# Patient Record
Sex: FEMALE | Race: WHITE | URBAN - METROPOLITAN AREA
[De-identification: names, ages, dates, MRNs, and addresses within clinical notes are randomized per-mention and may not be internally consistent; named-entity substitution may affect disease eponyms.]

---

## 2019-04-05 ENCOUNTER — TRANSFERRED RECORDS (OUTPATIENT)
Dept: HEALTH INFORMATION MANAGEMENT | Facility: CLINIC | Age: 74
End: 2019-04-05

## 2019-04-05 ENCOUNTER — MEDICAL CORRESPONDENCE (OUTPATIENT)
Dept: HEALTH INFORMATION MANAGEMENT | Facility: CLINIC | Age: 74
End: 2019-04-05

## 2019-04-29 ENCOUNTER — DOCUMENTATION ONLY (OUTPATIENT)
Dept: CARE COORDINATION | Facility: CLINIC | Age: 74
End: 2019-04-29

## 2019-05-02 ENCOUNTER — TRANSFERRED RECORDS (OUTPATIENT)
Dept: HEALTH INFORMATION MANAGEMENT | Facility: CLINIC | Age: 74
End: 2019-05-02

## 2019-05-08 ENCOUNTER — TELEPHONE (OUTPATIENT)
Dept: NEUROSURGERY | Facility: CLINIC | Age: 74
End: 2019-05-08

## 2019-05-08 ENCOUNTER — OFFICE VISIT (OUTPATIENT)
Dept: NEUROSURGERY | Facility: CLINIC | Age: 74
End: 2019-05-08
Payer: COMMERCIAL

## 2019-05-08 VITALS
OXYGEN SATURATION: 98 % | BODY MASS INDEX: 20.18 KG/M2 | WEIGHT: 113.9 LBS | HEART RATE: 65 BPM | DIASTOLIC BLOOD PRESSURE: 47 MMHG | HEIGHT: 63 IN | SYSTOLIC BLOOD PRESSURE: 135 MMHG

## 2019-05-08 DIAGNOSIS — R51.9 FACIAL PAIN: Primary | ICD-10-CM

## 2019-05-08 DIAGNOSIS — R51.9 FACIAL PAIN: ICD-10-CM

## 2019-05-08 LAB
ALBUMIN SERPL-MCNC: 4 G/DL (ref 3.4–5)
ALP SERPL-CCNC: 101 U/L (ref 40–150)
ALT SERPL W P-5'-P-CCNC: 25 U/L (ref 0–50)
ANION GAP SERPL CALCULATED.3IONS-SCNC: 5 MMOL/L (ref 3–14)
AST SERPL W P-5'-P-CCNC: 18 U/L (ref 0–45)
BASOPHILS # BLD AUTO: 0 10E9/L (ref 0–0.2)
BASOPHILS NFR BLD AUTO: 0.5 %
BILIRUB SERPL-MCNC: 0.4 MG/DL (ref 0.2–1.3)
BUN SERPL-MCNC: 12 MG/DL (ref 7–30)
CALCIUM SERPL-MCNC: 8.9 MG/DL (ref 8.5–10.1)
CHLORIDE SERPL-SCNC: 95 MMOL/L (ref 94–109)
CO2 SERPL-SCNC: 28 MMOL/L (ref 20–32)
CREAT SERPL-MCNC: 0.49 MG/DL (ref 0.52–1.04)
DIFFERENTIAL METHOD BLD: ABNORMAL
EOSINOPHIL # BLD AUTO: 0.2 10E9/L (ref 0–0.7)
EOSINOPHIL NFR BLD AUTO: 3 %
ERYTHROCYTE [DISTWIDTH] IN BLOOD BY AUTOMATED COUNT: 11.5 % (ref 10–15)
GFR SERPL CREATININE-BSD FRML MDRD: >90 ML/MIN/{1.73_M2}
GLUCOSE SERPL-MCNC: 91 MG/DL (ref 70–99)
HCT VFR BLD AUTO: 36.9 % (ref 35–47)
HGB BLD-MCNC: 13.1 G/DL (ref 11.7–15.7)
IMM GRANULOCYTES # BLD: 0.1 10E9/L (ref 0–0.4)
IMM GRANULOCYTES NFR BLD: 0.9 %
LYMPHOCYTES # BLD AUTO: 2.2 10E9/L (ref 0.8–5.3)
LYMPHOCYTES NFR BLD AUTO: 28.4 %
MCH RBC QN AUTO: 34 PG (ref 26.5–33)
MCHC RBC AUTO-ENTMCNC: 35.5 G/DL (ref 31.5–36.5)
MCV RBC AUTO: 96 FL (ref 78–100)
MONOCYTES # BLD AUTO: 0.9 10E9/L (ref 0–1.3)
MONOCYTES NFR BLD AUTO: 11.3 %
NEUTROPHILS # BLD AUTO: 4.2 10E9/L (ref 1.6–8.3)
NEUTROPHILS NFR BLD AUTO: 55.9 %
NRBC # BLD AUTO: 0 10*3/UL
NRBC BLD AUTO-RTO: 0 /100
PLATELET # BLD AUTO: 293 10E9/L (ref 150–450)
POTASSIUM SERPL-SCNC: 3.9 MMOL/L (ref 3.4–5.3)
PROT SERPL-MCNC: 7.4 G/DL (ref 6.8–8.8)
RBC # BLD AUTO: 3.85 10E12/L (ref 3.8–5.2)
SODIUM SERPL-SCNC: 128 MMOL/L (ref 133–144)
WBC # BLD AUTO: 7.6 10E9/L (ref 4–11)

## 2019-05-08 RX ORDER — BACLOFEN 10 MG/1
TABLET ORAL
Refills: 0 | COMMUNITY
Start: 2019-05-01 | End: 2019-06-10

## 2019-05-08 RX ORDER — CARBAMAZEPINE 200 MG/1
1 TABLET ORAL
Refills: 1 | COMMUNITY
Start: 2019-04-16 | End: 2019-06-25

## 2019-05-08 RX ORDER — ACETAMINOPHEN 325 MG/1
650 TABLET ORAL EVERY 4 HOURS PRN
COMMUNITY

## 2019-05-08 RX ORDER — HYDROCODONE BITARTRATE AND ACETAMINOPHEN 5; 325 MG/1; MG/1
TABLET ORAL
Refills: 0 | COMMUNITY
Start: 2019-04-24

## 2019-05-08 ASSESSMENT — PAIN SCALES - GENERAL: PAINLEVEL: NO PAIN (0)

## 2019-05-08 ASSESSMENT — MIFFLIN-ST. JEOR: SCORE: 995.78
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
## 2019-05-08 NOTE — PATIENT INSTRUCTIONS
1.  Take Tegretol (200 mg/ tablet) base on the following schedule:       Take 1.5 tablet (= 300 mg) at 7 am, 2 pm and 9 pm.     2.  Stop by the lab on the first floor today for blood drawn on the way out.     3.   Sybil RN will call patient Friday morning for medication update.  [phone].     4. Follow up in 4-6 weeks with XOCHITL Morrow, same day as research MRI.    Thank you for using [facility] for your healthcare needs.
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
## 2019-05-08 NOTE — TELEPHONE ENCOUNTER
Spoke with patient, explained Na level 128 slightly low, please add table salt daily, will re draw at OV in 4-6 weeks.  Pt teachback method completed.    CHAVEZ Devine            Please reorder the Tegretol level.  Her Na is 128. I have already reduced her Tegretol from 600 mg BID to 300 mg TID. Please call her and tell her to add extra NA in her diet.  We will need to have her repeat the CMP when she returns to see me.    Thanks,  XOCHITL Morrow

## 2019-05-08 NOTE — LETTER
Date:May 15, 2019      Patient was self referred, no letter generated. Do not send.        [facility] Information

## 2019-05-08 NOTE — NURSING NOTE
Chief Complaint   Patient presents with     Consult     [facility] NEW - FACIAL PAIN, TRIGEMINAL NEURALGIA       Kevin Donovan, EMT

## 2019-05-10 ENCOUNTER — PATIENT OUTREACH (OUTPATIENT)
Dept: NEUROSURGERY | Facility: CLINIC | Age: 74
End: 2019-05-10

## 2019-05-10 NOTE — PROGRESS NOTES
Spoke with patient regarding medication changes made at OV last Wed.    Pt now taking 1/1/2 tablets TID.  Pt states awoke both Thursday and Friday morning in pain prior to taking morning medication, appears to have breakthrough pain early AM, night time dosage not holding away discomfort.  After 10-11ish no real facial discomfort, it just starts early in the morning.  Will refer to NP Ritter and get back to patient.

## 2019-05-13 DIAGNOSIS — E87.1 LOW SODIUM LEVELS: Primary | ICD-10-CM

## 2019-05-13 NOTE — PROGRESS NOTES
Spoke with patient, states having breakthrough facial pains many times over the weekend and states last night had pain through the night.    Remains on Tegretol 1 1/2 tablets TID.  Will refer to NP Ritter and return call today.  Voices understanding.

## 2019-05-13 NOTE — PROGRESS NOTES
She is already taking Baclofen for muscle spasm. I have spoken to the  and will try the following regimen:   Baclofen 10 mg at  7 am, 1 pm, and 7 pm   Tegretol 300 mg at 10 am, 4 pm and 11 pm.   Per NP Ritter    Call set to update with patient in 3 days.

## 2019-05-14 ENCOUNTER — PATIENT OUTREACH (OUTPATIENT)
Dept: NEUROSURGERY | Facility: CLINIC | Age: 74
End: 2019-05-14

## 2019-05-14 NOTE — PROGRESS NOTES
Update call to patient, Patient continues to have breakthrough facial pains, awoke with pain today, states pain goes down Right jaw.  States taking medication on schedule per NP Ritter  Pt will have Na level near home  O   F     Lab order faxed.    Will send note to NP Ritter explaining patient continues to have break through facial pains.   Voices understanding.

## 2019-05-16 ENCOUNTER — PATIENT OUTREACH (OUTPATIENT)
Dept: NEUROSURGERY | Facility: CLINIC | Age: 74
End: 2019-05-16

## 2019-05-16 NOTE — PROGRESS NOTES
New Na level 136.  Call to patient, spoke with  Gerson with the lab work in therapeutic range.  States taking medication as directed by NP Cristhian, states having some breakthrough pains but has improved with severity of pain.    Recommend follow NP medication schedule, callback if symptoms change, worsen, or concerns.    Voices understanding.

## 2019-06-06 ENCOUNTER — OFFICE VISIT (OUTPATIENT)
Dept: NEUROSURGERY | Facility: CLINIC | Age: 74
End: 2019-06-06
Payer: COMMERCIAL

## 2019-06-06 VITALS
DIASTOLIC BLOOD PRESSURE: 80 MMHG | HEIGHT: 63 IN | BODY MASS INDEX: 19.68 KG/M2 | SYSTOLIC BLOOD PRESSURE: 140 MMHG | TEMPERATURE: 98 F | HEART RATE: 62 BPM | WEIGHT: 111.1 LBS | RESPIRATION RATE: 16 BRPM | OXYGEN SATURATION: 97 %

## 2019-06-06 DIAGNOSIS — G50.0 TRIGEMINAL NEURALGIA: Primary | ICD-10-CM

## 2019-06-06 ASSESSMENT — PAIN SCALES - GENERAL: PAINLEVEL: MILD PAIN (3)

## 2019-06-06 ASSESSMENT — MIFFLIN-ST. JEOR: SCORE: 983.08
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
## 2019-06-06 NOTE — NURSING NOTE
Chief Complaint   Patient presents with     Facial Pain     [facility] RETURN FACIAL PAIN, F/U       See Garibay, EMT

## 2019-06-06 NOTE — PATIENT INSTRUCTIONS
Follow up and lab test in 2 months.    Continue taking Tegretol and Baclofen as instructed.    Call [phone] for concerns or questions.
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
## 2019-06-10 DIAGNOSIS — G50.0 TRIGEMINAL NEURALGIA: Primary | ICD-10-CM

## 2019-06-10 RX ORDER — BACLOFEN 10 MG/1
10 TABLET ORAL 3 TIMES DAILY
Qty: 90 TABLET | Refills: 3 | Status: SHIPPED | OUTPATIENT
Start: 2019-06-10

## 2019-06-10 NOTE — PROGRESS NOTES
Patient calling asking for refill of Baclofen, taking 10mg TID.  States falcial pain mostly under control taking the Bacolen and Tegretol.  LOV:  6/6/19      right sided V2 and V3 trigeminal neuralgia that has responded to Tegretol and Baclofen nicely, although not completely.

## 2019-06-25 ENCOUNTER — PATIENT OUTREACH (OUTPATIENT)
Dept: NEUROSURGERY | Facility: CLINIC | Age: 74
End: 2019-06-25

## 2019-06-25 DIAGNOSIS — G50.0 TRIGEMINAL NEURALGIA: Primary | ICD-10-CM

## 2019-06-25 RX ORDER — CARBAMAZEPINE 200 MG/1
TABLET ORAL
Qty: 150 TABLET | Refills: 2 | Status: SHIPPED | OUTPATIENT
Start: 2019-06-25

## 2019-06-25 NOTE — PROGRESS NOTES
Patient calling asking for refill of Tegretol 200mg taking 1/1/2 tablets at 10a 4p and 11p alternating with Baclofen.  States has some small breakthrough pains but doing ok. Last lab work Na 136 May 16th.  Medication refilled, understanding next lab work due beginning of August.    Voices understanding, RX refilled at [facility] in [facility] per patient request.

## 2019-08-30 ENCOUNTER — OFFICE VISIT (OUTPATIENT)
Dept: NEUROSURGERY | Facility: CLINIC | Age: 74
End: 2019-08-30
Payer: COMMERCIAL

## 2019-08-30 VITALS
DIASTOLIC BLOOD PRESSURE: 66 MMHG | SYSTOLIC BLOOD PRESSURE: 143 MMHG | BODY MASS INDEX: 18.44 KG/M2 | WEIGHT: 108 LBS | OXYGEN SATURATION: 98 % | RESPIRATION RATE: 17 BRPM | HEART RATE: 64 BPM | HEIGHT: 64 IN

## 2019-08-30 DIAGNOSIS — Z09 FOLLOW UP: ICD-10-CM

## 2019-08-30 DIAGNOSIS — G50.0 TRIGEMINAL NEURALGIA: ICD-10-CM

## 2019-08-30 DIAGNOSIS — G50.0 TRIGEMINAL NEURALGIA: Primary | ICD-10-CM

## 2019-08-30 LAB
ALBUMIN SERPL-MCNC: 3.9 G/DL (ref 3.4–5)
ALP SERPL-CCNC: 137 U/L (ref 40–150)
ALT SERPL W P-5'-P-CCNC: 20 U/L (ref 0–50)
ANION GAP SERPL CALCULATED.3IONS-SCNC: 2 MMOL/L (ref 3–14)
AST SERPL W P-5'-P-CCNC: 16 U/L (ref 0–45)
BASOPHILS # BLD AUTO: 0 10E9/L (ref 0–0.2)
BASOPHILS NFR BLD AUTO: 0.5 %
BILIRUB SERPL-MCNC: 0.2 MG/DL (ref 0.2–1.3)
BUN SERPL-MCNC: 14 MG/DL (ref 7–30)
CALCIUM SERPL-MCNC: 8 MG/DL (ref 8.5–10.1)
CHLORIDE SERPL-SCNC: 102 MMOL/L (ref 94–109)
CO2 SERPL-SCNC: 30 MMOL/L (ref 20–32)
CREAT SERPL-MCNC: 0.5 MG/DL (ref 0.52–1.04)
DIFFERENTIAL METHOD BLD: ABNORMAL
EOSINOPHIL # BLD AUTO: 0.3 10E9/L (ref 0–0.7)
EOSINOPHIL NFR BLD AUTO: 3.7 %
ERYTHROCYTE [DISTWIDTH] IN BLOOD BY AUTOMATED COUNT: 11.9 % (ref 10–15)
GFR SERPL CREATININE-BSD FRML MDRD: >90 ML/MIN/{1.73_M2}
GLUCOSE SERPL-MCNC: 95 MG/DL (ref 70–99)
HCT VFR BLD AUTO: 37.6 % (ref 35–47)
HGB BLD-MCNC: 13 G/DL (ref 11.7–15.7)
IMM GRANULOCYTES # BLD: 0.1 10E9/L (ref 0–0.4)
IMM GRANULOCYTES NFR BLD: 0.6 %
LYMPHOCYTES # BLD AUTO: 2.4 10E9/L (ref 0.8–5.3)
LYMPHOCYTES NFR BLD AUTO: 26.7 %
MCH RBC QN AUTO: 33.9 PG (ref 26.5–33)
MCHC RBC AUTO-ENTMCNC: 34.6 G/DL (ref 31.5–36.5)
MCV RBC AUTO: 98 FL (ref 78–100)
MONOCYTES # BLD AUTO: 0.9 10E9/L (ref 0–1.3)
MONOCYTES NFR BLD AUTO: 10 %
NEUTROPHILS # BLD AUTO: 5.2 10E9/L (ref 1.6–8.3)
NEUTROPHILS NFR BLD AUTO: 58.5 %
NRBC # BLD AUTO: 0 10*3/UL
NRBC BLD AUTO-RTO: 0 /100
PLATELET # BLD AUTO: 281 10E9/L (ref 150–450)
POTASSIUM SERPL-SCNC: 3.9 MMOL/L (ref 3.4–5.3)
PROT SERPL-MCNC: 7.2 G/DL (ref 6.8–8.8)
RBC # BLD AUTO: 3.83 10E12/L (ref 3.8–5.2)
SODIUM SERPL-SCNC: 134 MMOL/L (ref 133–144)
WBC # BLD AUTO: 8.9 10E9/L (ref 4–11)

## 2019-08-30 RX ORDER — BACLOFEN 10 MG/1
10 TABLET ORAL 2 TIMES DAILY
Qty: 180 TABLET | Refills: 0 | Status: SHIPPED | OUTPATIENT
Start: 2019-08-30

## 2019-08-30 RX ORDER — CARBAMAZEPINE 200 MG/1
300 TABLET ORAL 3 TIMES DAILY
Qty: 135 TABLET | Refills: 2 | Status: SHIPPED | OUTPATIENT
Start: 2019-08-30

## 2019-08-30 ASSESSMENT — MIFFLIN-ST. JEOR: SCORE: 971.94

## 2019-08-30 ASSESSMENT — PAIN SCALES - GENERAL: PAINLEVEL: NO PAIN (0)
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
## 2019-08-30 NOTE — NURSING NOTE
Chief Complaint   Patient presents with     RECHECK     Facial pain    Medications reviewed and vital signs taken.   Andrew Calderón CMA

## 2019-08-30 NOTE — PATIENT INSTRUCTIONS
Proceed to the lab on 1F today for lab tests.    May cut Baclofen 10 mg twice per day. Keep taking Tegretol 300 mg 3 times per day unless instructed differently.    Call [phone] for concerns or questions.

## 2019-09-04 ENCOUNTER — PATIENT OUTREACH (OUTPATIENT)
Dept: NEUROSURGERY | Facility: CLINIC | Age: 74
End: 2019-09-04

## 2019-09-04 NOTE — PROGRESS NOTES
Patient calling stating right lower jaw facial pain has been severe since dental appointment on 8/30/19.  Triggers include eating, talking.  Pt increased medication per self  Now taking Tegretol 300mg four times daily (^from TID) and has kept Baclofen 10mg at TID.  Pt asking for [place].  Explained that does not usually help facial type pain.  Recommended try Ibuprofen, patient states that is not strong enough.  Explained would pass along information to NP Cristhian.    Voices understanding.

## 2019-09-05 NOTE — PROGRESS NOTES
Please have her take Baclofen and Tegretol base on the following schedule:   Increase: Baclofen 15 mg at  7 am, 1 pm, and 7 pm   Reduce: Tegretol 300 mg at 10 am, 4 pm and 11 pm.   Ibuprofen 200 mg q 8 hours (with a piece of cracker) for 5 days and PRN.     She developed hyponatremia in 5/2019 while taking Tegretol 600 mg BID, which is the equivalent of Tegretol 300 mg QID. Her 8/2019 Na level was borderline. It may crash again if she stay on 300 mg QID.     Please follow up with her in a few days she increases the Baclofen.     Thanks,   NP Cristhian     Spoke with patient in detail, patient schedule given with patient teachback method completed.  States will take Tylenol instead of Ibuprofen, Ibuprofen upsets stomach even with cracker.  Ok, try new medication regime and callback in 3-4 days with update.    Pt also had dental work completed yesterday.    Voices understanding.

## 2019-09-30 ENCOUNTER — PATIENT OUTREACH (OUTPATIENT)
Dept: NEUROSURGERY | Facility: CLINIC | Age: 74
End: 2019-09-30

## 2019-09-30 DIAGNOSIS — G50.0 TRIGEMINAL NEURALGIA: Primary | ICD-10-CM

## 2019-09-30 NOTE — PROGRESS NOTES
Callback to patient, explained NP Cristhian would like to see patient for facial pain.  Thursday 10/3/19 @ 1030am.stopping at lab for cmp and cbc.    Pt voices understanding.

## 2019-09-30 NOTE — PROGRESS NOTES
"Spoke with patient, states having horrible facial shocks and pain that got worse over the weekend.  Baclofen 15mg TID  Tegretol 300mg TID and states taking extra Tegretol, unable to handle the pain.  States \"I need to do something and I can't stand this anymore.\".  Will review with NPRitter and get back to patient.          "

## 2019-10-01 ENCOUNTER — PATIENT OUTREACH (OUTPATIENT)
Dept: NEUROSURGERY | Facility: CLINIC | Age: 74
End: 2019-10-01

## 2019-10-01 NOTE — PROGRESS NOTES
calling stating uable to keep appointment with NP Ritter this Thursday at 10am, states to early.  Offered appointment this Thursday at 2pm, patient declined.  Offered appointment Friday 10/4 at 10am patient declined.    Offered appointment on Friday 10/11 at 1130 am, appointment accepted.    Pt continues to have facail pain on medication.  Explained need appointment and lab work.  Tried to get patient to accept a sooner appointment but refused until 10/11/19.     notified.  Callback for questions/concerns.

## 2019-10-11 ENCOUNTER — OFFICE VISIT (OUTPATIENT)
Dept: NEUROSURGERY | Facility: CLINIC | Age: 74
End: 2019-10-11
Payer: COMMERCIAL

## 2019-10-11 VITALS
WEIGHT: 112 LBS | HEIGHT: 63 IN | OXYGEN SATURATION: 98 % | DIASTOLIC BLOOD PRESSURE: 71 MMHG | RESPIRATION RATE: 15 BRPM | SYSTOLIC BLOOD PRESSURE: 179 MMHG | BODY MASS INDEX: 19.84 KG/M2 | HEART RATE: 62 BPM

## 2019-10-11 DIAGNOSIS — Z09 FOLLOW UP: ICD-10-CM

## 2019-10-11 DIAGNOSIS — G50.0 TRIGEMINAL NEURALGIA: ICD-10-CM

## 2019-10-11 DIAGNOSIS — E87.1 LOW SODIUM LEVELS: ICD-10-CM

## 2019-10-11 LAB
ALBUMIN SERPL-MCNC: 3.7 G/DL (ref 3.4–5)
ALP SERPL-CCNC: 122 U/L (ref 40–150)
ALT SERPL W P-5'-P-CCNC: 22 U/L (ref 0–50)
ANION GAP SERPL CALCULATED.3IONS-SCNC: 5 MMOL/L (ref 3–14)
AST SERPL W P-5'-P-CCNC: 18 U/L (ref 0–45)
BASOPHILS # BLD AUTO: 0 10E9/L (ref 0–0.2)
BASOPHILS NFR BLD AUTO: 0.6 %
BILIRUB SERPL-MCNC: 0.3 MG/DL (ref 0.2–1.3)
BUN SERPL-MCNC: 10 MG/DL (ref 7–30)
CALCIUM SERPL-MCNC: 8.4 MG/DL (ref 8.5–10.1)
CHLORIDE SERPL-SCNC: 100 MMOL/L (ref 94–109)
CO2 SERPL-SCNC: 28 MMOL/L (ref 20–32)
CREAT SERPL-MCNC: 0.47 MG/DL (ref 0.52–1.04)
DIFFERENTIAL METHOD BLD: ABNORMAL
EOSINOPHIL # BLD AUTO: 0.2 10E9/L (ref 0–0.7)
EOSINOPHIL NFR BLD AUTO: 3.1 %
ERYTHROCYTE [DISTWIDTH] IN BLOOD BY AUTOMATED COUNT: 11.7 % (ref 10–15)
GFR SERPL CREATININE-BSD FRML MDRD: >90 ML/MIN/{1.73_M2}
GLUCOSE SERPL-MCNC: 91 MG/DL (ref 70–99)
HCT VFR BLD AUTO: 38.7 % (ref 35–47)
HGB BLD-MCNC: 13 G/DL (ref 11.7–15.7)
IMM GRANULOCYTES # BLD: 0.1 10E9/L (ref 0–0.4)
IMM GRANULOCYTES NFR BLD: 0.9 %
LYMPHOCYTES # BLD AUTO: 1.9 10E9/L (ref 0.8–5.3)
LYMPHOCYTES NFR BLD AUTO: 27 %
MCH RBC QN AUTO: 33.3 PG (ref 26.5–33)
MCHC RBC AUTO-ENTMCNC: 33.6 G/DL (ref 31.5–36.5)
MCV RBC AUTO: 99 FL (ref 78–100)
MONOCYTES # BLD AUTO: 0.9 10E9/L (ref 0–1.3)
MONOCYTES NFR BLD AUTO: 12.4 %
NEUTROPHILS # BLD AUTO: 3.8 10E9/L (ref 1.6–8.3)
NEUTROPHILS NFR BLD AUTO: 56 %
NRBC # BLD AUTO: 0 10*3/UL
NRBC BLD AUTO-RTO: 0 /100
PLATELET # BLD AUTO: 264 10E9/L (ref 150–450)
POTASSIUM SERPL-SCNC: 4 MMOL/L (ref 3.4–5.3)
PROT SERPL-MCNC: 7.4 G/DL (ref 6.8–8.8)
RBC # BLD AUTO: 3.9 10E12/L (ref 3.8–5.2)
SODIUM SERPL-SCNC: 133 MMOL/L (ref 133–144)
WBC # BLD AUTO: 6.8 10E9/L (ref 4–11)

## 2019-10-11 ASSESSMENT — MIFFLIN-ST. JEOR: SCORE: 982.16

## 2019-10-11 ASSESSMENT — PAIN SCALES - GENERAL: PAINLEVEL: NO PAIN (0)

## 2019-10-11 NOTE — NURSING NOTE
Chief Complaint   Patient presents with     RECHECK     [facility] RETURN TRIGEMINAL NEURALGIA DISCOMFORT     Peyton Delatorre CMA

## 2019-10-11 NOTE — PATIENT INSTRUCTIONS
Stop Baclofen. Continue Tegretol 300 mg 3 times per day. Make sure that you have your lab tests (CMP and CBC with differentials) done every 3 months locally at your doctor's office.    Suggest making an appointment with Dr. Marinelli or Dr. Daly for medical management of trigeminal neuralgia.    Call [phone] to schedule an appointment with Dr. Paul (before the end of 12/2019) or Dr. Mario Stevenson if you are considering surgery  for trigeminal neuralgia.
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
## 2019-10-11 NOTE — LETTER
Date:October 16, 2019      Patient was self referred, no letter generated. Do not send.        [facility] Information
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
## 2019-11-04 ENCOUNTER — HEALTH MAINTENANCE LETTER (OUTPATIENT)
Age: 74
End: 2019-11-04

## 2020-02-16 ENCOUNTER — HEALTH MAINTENANCE LETTER (OUTPATIENT)
Age: 75
End: 2020-02-16

## 2020-11-22 ENCOUNTER — HEALTH MAINTENANCE LETTER (OUTPATIENT)
Age: 75
End: 2020-11-22

## 2021-04-04 ENCOUNTER — HEALTH MAINTENANCE LETTER (OUTPATIENT)
Age: 76
End: 2021-04-04

## 2021-09-19 ENCOUNTER — HEALTH MAINTENANCE LETTER (OUTPATIENT)
Age: 76
End: 2021-09-19

## 2021-11-13 ENCOUNTER — HEALTH MAINTENANCE LETTER (OUTPATIENT)
Age: 76
End: 2021-11-13

## 2022-04-30 ENCOUNTER — HEALTH MAINTENANCE LETTER (OUTPATIENT)
Age: 77
End: 2022-04-30

## 2022-11-20 ENCOUNTER — HEALTH MAINTENANCE LETTER (OUTPATIENT)
Age: 77
End: 2022-11-20

## 2023-06-02 ENCOUNTER — HEALTH MAINTENANCE LETTER (OUTPATIENT)
Age: 78
End: 2023-06-02